# Patient Record
Sex: MALE | Race: WHITE | NOT HISPANIC OR LATINO | URBAN - METROPOLITAN AREA
[De-identification: names, ages, dates, MRNs, and addresses within clinical notes are randomized per-mention and may not be internally consistent; named-entity substitution may affect disease eponyms.]

---

## 2018-04-10 ENCOUNTER — TELEPHONE (OUTPATIENT)
Dept: OBGYN CLINIC | Facility: HOSPITAL | Age: 53
End: 2018-04-10

## 2018-04-10 NOTE — TELEPHONE ENCOUNTER
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

## 2020-01-28 ENCOUNTER — OFFICE VISIT (OUTPATIENT)
Dept: OBGYN CLINIC | Facility: CLINIC | Age: 55
End: 2020-01-28
Payer: COMMERCIAL

## 2020-01-28 ENCOUNTER — APPOINTMENT (OUTPATIENT)
Dept: RADIOLOGY | Facility: CLINIC | Age: 55
End: 2020-01-28
Payer: COMMERCIAL

## 2020-01-28 VITALS
WEIGHT: 212.8 LBS | HEART RATE: 75 BPM | HEIGHT: 69 IN | SYSTOLIC BLOOD PRESSURE: 157 MMHG | DIASTOLIC BLOOD PRESSURE: 102 MMHG | BODY MASS INDEX: 31.52 KG/M2

## 2020-01-28 DIAGNOSIS — M25.512 LEFT SHOULDER PAIN, UNSPECIFIED CHRONICITY: ICD-10-CM

## 2020-01-28 DIAGNOSIS — M75.22 BICEPS TENDONITIS ON LEFT: ICD-10-CM

## 2020-01-28 DIAGNOSIS — I10 HYPERTENSION, UNSPECIFIED TYPE: ICD-10-CM

## 2020-01-28 DIAGNOSIS — M75.82 ROTATOR CUFF TENDONITIS, LEFT: Primary | ICD-10-CM

## 2020-01-28 PROCEDURE — 99203 OFFICE O/P NEW LOW 30 MIN: CPT | Performed by: ORTHOPAEDIC SURGERY

## 2020-01-28 PROCEDURE — 73030 X-RAY EXAM OF SHOULDER: CPT

## 2020-01-28 RX ORDER — DULOXETIN HYDROCHLORIDE 60 MG/1
CAPSULE, DELAYED RELEASE ORAL
COMMUNITY
Start: 2019-12-23

## 2020-01-28 RX ORDER — OMEPRAZOLE 20 MG/1
CAPSULE, DELAYED RELEASE ORAL
COMMUNITY
Start: 2020-01-08

## 2020-01-28 RX ORDER — PERINDOPRIL ERBUMINE 8 MG/1
TABLET ORAL
COMMUNITY
Start: 2019-12-02

## 2020-01-28 RX ORDER — TESTOSTERONE 30 MG/1.5ML
SOLUTION TOPICAL
COMMUNITY
Start: 2019-08-13

## 2020-01-28 NOTE — PROGRESS NOTES
Assessment/Plan:  1  Rotator cuff tendonitis, left  XR shoulder 2+ vw left    Ambulatory referral to Physical Therapy   2  Biceps tendonitis on left  Ambulatory referral to Physical Therapy   3  Hypertension, unspecified type         Scribe Attestation    I,:   Leonor Rao am acting as a scribe while in the presence of the attending physician :        I,:   Aletha Goff MD personally performed the services described in this documentation    as scribed in my presence :              Jaswant Murphy upon examination and review the x-rays of his left shoulder does demonstrate signs and symptoms consistent with rotator cuff tendinitis, biceps tendinitis  He does demonstrate excellent range of motion and good overall strength however is weak and painful into abduction, and internal rotation  Given the findings on his clinical exam as well as on the x-rays I do believe she will benefit from conservative measures treatment form of a physician directed exercise program   My  will instruct him on proper execution of the exercises today  I did provide him with a prescription for formal therapy should he decide that he wants to do formal therapy  I do not have any restrictions on [place] at this point  Should Keesha fail to see improvements with his home exercises, or formal therapy he may follow up with me, and we may consider further diagnostic studies such as an MRI to question rotator cuff tear, biceps tendon tear  Otherwise he may follow up with me on as-needed basis  Jaswant Murphy did have a very high blood pressure of 168/108 at admission, and 157/102 at the conclusion of his appointment  He does remark that he is on blood pressure medications, and does typically run high  However cannot does deny any symptoms such as lightheadedness, dizziness, chest pain, or trouble breathing    He was advised to follow up with his primary care physician, and he did verbalize understanding this and will follow up his primary care physician regards to this matter  Subjective:   Rita Tello is a 53 y o  male who presents to the office today for initial evaluation of the left shoulder  He states that he has been experiencing intermittent and mild to moderate sharp pain at about the anterior, and posterior aspect of her shoulder over the past several months  He denies a traumatic event resulting his painful symptoms  He notes that overhead activities such as lap pull downs, and splitting of wood exacerbates his painful symptoms  He states that he does not experience any significant restrictions in range of motion however is particularly sore with abduction  He does note that some of the pain is similar to what he was experiencing on the contralateral side  He did have a right shoulder arthroscopy with rotator cuff repair and biceps tenodesis approximately 4 years ago  Cody Mulligan does also remark on popping, and clicking into his shoulder during his workup routines with overhead activities  He has not undergone any formal treatment such as physical therapy for his left shoulder up to this point  He has been taking Advil intermittently and notes that this does take the edge off his pain to help him sleep at night  Today he denies any distal paresthesias  Review of Systems   Constitutional: Negative for chills, fever and unexpected weight change  HENT: Negative for hearing loss, nosebleeds and sore throat  Eyes: Negative for pain, redness and visual disturbance  Respiratory: Negative for cough, shortness of breath and wheezing  Cardiovascular: Negative for chest pain, palpitations and leg swelling  Gastrointestinal: Negative for abdominal pain, nausea and vomiting  Endocrine: Negative for polyphagia and polyuria  Genitourinary: Negative for dysuria and hematuria  Musculoskeletal: Positive for arthralgias  See HPI   Skin: Negative for rash and wound     Neurological: Negative for dizziness, numbness and headaches  Psychiatric/Behavioral: Negative for decreased concentration and suicidal ideas  The patient is not nervous/anxious            Past Medical History:   Diagnosis Date    Disease of thyroid gland     hypothyroid    Fibromyalgia     Testosterone deficiency        Past Surgical History:   Procedure Laterality Date    COLONOSCOPY  2014    HERNIA REPAIR      x2    KNEE ARTHROSCOPY Right 2006    LASIK Bilateral     ROTATOR CUFF REPAIR Right 7/28/2016    Procedure: SHOULDER ARTHROSCOPY ROTATOR CUFF REPAIR (SUPRASPINATUS AND SUBCAPULARIS TENDONS), BICEPS TENODESIS AND SUBACROMIAL DECOMPRESSION;  Surgeon: Yuri Duffy MD;  Location: [facility], [place] MAIN OR;  Service:        Family History   Problem Relation Age of Onset    COPD Mother         was a heavy smoker    Heart disease Father 58        massive mi    Mental illness Brother         bi-polar       Social History     Occupational History    Not on file   Tobacco Use    Smoking status: Former Smoker     Packs/day: 1 00     Types: Cigars    Smokeless tobacco: Never Used    Tobacco comment: occ cigar smoker   Substance and Sexual Activity    Alcohol use: No    Drug use: No    Sexual activity: Not on file         Current Outpatient Medications:     Testosterone 30 MG/ACT SOLN, testosterone 30 mg/actuation (1 5 mL) transderm solution metered pump, Disp: , Rfl:     aspirin 325 mg tablet, Take 325 mg by mouth daily after dinner , Disp: , Rfl:     DULoxetine (CYMBALTA) 60 mg delayed release capsule, , Disp: , Rfl:     levothyroxine (SYNTHROID) 150 mcg tablet, Take 150 mcg by mouth every morning , Disp: , Rfl:     omeprazole (PriLOSEC) 20 mg delayed release capsule, , Disp: , Rfl:     perindopril (ACEON) 8 MG tablet, , Disp: , Rfl:     No Known Allergies    Objective:  Vitals:    01/28/20 0832   BP: (!) 157/102   Pulse: 75       Left Shoulder Exam     Tenderness   The patient is experiencing tenderness in the biceps tendon  Range of Motion   Active abduction: 170   Passive abduction: 170   External rotation: 70   Forward flexion: 180   Internal rotation 0 degrees: L3     Muscle Strength   Abduction: 4/5   Internal rotation: 4/5   External rotation: 5/5     Tests   Polo test: negative  Cross arm: negative  Impingement: negative  Drop arm: negative    Other   Erythema: absent  Scars: absent  Sensation: normal  Pulse: present             Physical Exam   Constitutional: He is oriented to person, place, and time  He appears well-developed and well-nourished  HENT:   Head: Normocephalic and atraumatic  Eyes: Conjunctivae are normal  Right eye exhibits no discharge  Left eye exhibits no discharge  Neck: Normal range of motion  Neck supple  Cardiovascular: Normal rate and intact distal pulses  Pulmonary/Chest: Effort normal  No respiratory distress  Musculoskeletal:   As noted in HPI   Neurological: He is alert and oriented to person, place, and time  Skin: Skin is warm and dry  Psychiatric: He has a normal mood and affect  His behavior is normal  Judgment and thought content normal    Vitals reviewed  I have personally reviewed pertinent films in PACS and my interpretation is as follows:    X-rays of the left shoulder demonstrate mild glenohumeral joint osteoarthritis, and no acute fracture

## 2020-01-28 NOTE — PATIENT INSTRUCTIONS
Exercises for Internal and External Shoulder Rotation   WHAT YOU NEED TO KNOW:   Exercises for internal shoulder rotation work the muscles in your chest and front of your shoulder  Exercises for external shoulder rotation work the muscles in the back of your shoulder and upper back  DISCHARGE INSTRUCTIONS:   Contact your healthcare provider if:   · You have sharp or worsening pain during exercise or at rest     · You have questions or concerns about your shoulder exercises  Before you exercise:  Warm up and stretch before you exercise  Walk or ride a stationary bike for 5 to 10 minutes to help you warm up  Stretching helps increase range of motion  It may also decrease muscle soreness and help prevent another injury  Your healthcare provider will tell you which of the following stretches to do:  · Crossover arm stretch:  Relax your shoulders  Hold your upper arm with the opposite hand  Pull your arm across your chest until you feel a stretch  Hold the stretch for 30 seconds  Return to the starting position  · Shoulder flexion stretch:  Stand facing a wall  Slowly walk your fingers up the wall until you feel a stretch  Hold the stretch for 30 seconds  Return to the starting position  · Sleeper stretch:  Lie on your injured side on a firm, flat surface  Bend the elbow of your injured arm 90° with your hand facing up  Use your arm that is not injured to slowly push your injured arm down  Stop when you feel a stretch at the back of your injured shoulder  Hold the stretch for 30 seconds  Slowly return to the starting position  How to exercise with a weight:  Your healthcare provider will tell you how much weight to exercise with  · Shoulder internal rotation:  Sit in a chair  Place a rolled up towel between your elbow and your side  Bend your elbow to 90°  Gently squeeze the towel with your elbow to prevent it from falling out  Hold the weight with your thumb pointing up   Slowly move the weight across your chest  Stop when your hand reaches your opposite arm  Hold this position for as many seconds as directed  Slowly return to the starting position  · Shoulder external rotation:  Lie on your side with your injured shoulder facing up  Bend your elbow 90°  Place a rolled up towel between your elbow and your side  Hold a weight in your hand  Gently squeeze the towel with your elbow to prevent it from falling out  Slowly rotate your arm outward, but keep your elbow bent  Stop when you feel a stretch  Hold this position for 30 seconds or as directed  Slowly return to the starting position  How to exercise with an exercise band:   · Shoulder internal rotation:  Tie one end of the exercise band to a heavy, secure object  Sit in a chair  Place a rolled up towel between your elbow and your side  Bend your elbow to 90°  Gently squeeze the towel with your elbow to prevent it from falling out  Slowly pull the band across your chest  Stop when your hand reaches your opposite arm  Hold this position for as many seconds as directed  Slowly return to the starting position  · Shoulder external rotation:  Hold one end of the exercise band on the side that is not injured  Place a rolled up towel between your elbow and your side  Bend your elbow 90°  Squeeze the towel with your elbow  Grab the end of the band and slowly turn your arm outward, but keep your elbow bent  Stop when you feel a stretch  Hold this position for 30 seconds or as directed  Slowly return to the starting position  Follow up with your physical therapist as directed:  Write down your questions so you remember to ask them at your visits  © 2017 [employer] Information is for End User's use only and may not be sold, redistributed or otherwise used for commercial purposes  All illustrations and images included in CareNotes® are the copyrighted property of [employer]  or [employer]    The above information is an  only  It is not intended as medical advice for individual conditions or treatments  Talk to your doctor, nurse or pharmacist before following any medical regimen to see if it is safe and effective for you  Exercises for Shoulder Abduction and Adduction   WHAT YOU NEED TO KNOW:   Shoulder abduction and adduction exercises work the muscles at the back of your shoulder and your upper back  DISCHARGE INSTRUCTIONS:   Contact your healthcare provider if:   · You have sharp or worsening pain during exercise or at rest     · You have questions or concerns about your shoulder exercises  Before you exercise:  Warm up and stretch before you exercise  Walk or ride a stationary bike for 5 to 10 minutes to help you warm up  Stretching helps increase range of motion  It may also decrease muscle soreness and help prevent another injury  Your healthcare provider will tell you which of the following stretches to do:  · Crossover arm stretch:  Relax your shoulders  Hold your upper arm with the opposite hand  Pull your arm across your chest until you feel a stretch  Hold the stretch for 30 seconds  Return to the starting position  · Shoulder flexion stretch:  Stand facing a wall  Slowly walk your fingers up the wall until you feel a stretch  Hold the stretch for 30 seconds  Return to the starting position  · Sleeper stretch:  Lie on your injured side on a firm, flat surface  Bend the elbow of your injured arm 90° with your hand facing up  Use your arm that is not injured to slowly push your injured arm down  Stop when you feel a stretch at the back of your injured shoulder  Hold the stretch for 30 seconds  Slowly return to the starting position  How to exercise with a weight:  Your healthcare provider will tell you how much weight to use  · Shoulder abduction:  Stand and hold a weight in your hand with your palm facing your body  Slowly raise your arm to the side with your thumb pointing up   Then raise your arm over your head as far as you can without pain  Hold this position for as long as directed  Do not raise your arm over your head unless your healthcare provider says it is okay  · Shoulder adduction:  Lie on your back on a firm surface  Extend your arm out to a "T " Bend your elbow so your forearm in the air  Hold a weight in your hand  Slowly raise your arm toward the ceiling and straighten your elbow  Hold this position for as long as directed  Slowly return to the starting position  How to exercise with an exercise band:   · Shoulder abduction:  Wrap the exercise band around a heavy, stable object near your foot  Grab the band with the hand of your injured shoulder  Keep your arm straight  Slowly raise your arm to the side with your thumb pointing up  Then, slowly pull the band over your head as far as you can without pain  Do not raise your arm over your head unless your healthcare provider says it is okay  Do not let your shoulder shrug  Hold this position for as long as directed  Slowly return to the starting position  · Shoulder adduction:  Wrap the exercise band around a heavy, stable object  Stand and face away from where the band is anchored  Hold each end of the band in both hands with your elbows bent  Your elbows should not be behind your body  Keep your arms parallel to the floor and slowly straighten your elbows  Hold this position for as long as directed  Slowly return to the starting position  Follow up with your physical therapist as directed:  Write down your questions so you remember to ask them at your visits  © 2017 [employer] Information is for End User's use only and may not be sold, redistributed or otherwise used for commercial purposes  All illustrations and images included in CareNotes® are the copyrighted property of [employer]  or [employer]  The above information is an  only   It is not intended as medical advice for individual conditions or treatments  Talk to your doctor, nurse or pharmacist before following any medical regimen to see if it is safe and effective for you